# Patient Record
Sex: FEMALE | URBAN - METROPOLITAN AREA
[De-identification: names, ages, dates, MRNs, and addresses within clinical notes are randomized per-mention and may not be internally consistent; named-entity substitution may affect disease eponyms.]

---

## 2024-08-23 ENCOUNTER — LAB OUTSIDE AN ENCOUNTER (OUTPATIENT)
Dept: URBAN - METROPOLITAN AREA CLINIC 124 | Facility: CLINIC | Age: 24
End: 2024-08-23

## 2024-08-23 ENCOUNTER — OFFICE VISIT (OUTPATIENT)
Dept: URBAN - METROPOLITAN AREA CLINIC 124 | Facility: CLINIC | Age: 24
End: 2024-08-23
Payer: COMMERCIAL

## 2024-08-23 ENCOUNTER — DASHBOARD ENCOUNTERS (OUTPATIENT)
Age: 24
End: 2024-08-23

## 2024-08-23 VITALS
TEMPERATURE: 96.8 F | DIASTOLIC BLOOD PRESSURE: 76 MMHG | BODY MASS INDEX: 22.56 KG/M2 | HEIGHT: 65 IN | SYSTOLIC BLOOD PRESSURE: 120 MMHG | WEIGHT: 135.4 LBS | HEART RATE: 79 BPM

## 2024-08-23 DIAGNOSIS — R10.13 EPIGASTRIC DISCOMFORT: ICD-10-CM

## 2024-08-23 PROCEDURE — 99204 OFFICE O/P NEW MOD 45 MIN: CPT | Performed by: INTERNAL MEDICINE

## 2024-08-23 RX ORDER — OMEPRAZOLE 40 MG/1
1 CAPSULE 30 MINUTES BEFORE MORNING MEAL CAPSULE, DELAYED RELEASE ORAL ONCE A DAY
Status: ACTIVE | COMMUNITY

## 2024-08-23 RX ORDER — FAMOTIDINE 40 MG/1
1 TABLET TABLET, FILM COATED ORAL TWICE A DAY
Status: ACTIVE | COMMUNITY

## 2024-08-23 NOTE — HPI-TODAY'S VISIT:
August 24 visit: Patient was seen in August 2024 at [facility] for epigastric abdominal pain and recommended to have a abdominal ultrasound as well as EGD.  Labs from June 2024 showed normal LFTs, hemoglobin.  She was prescribed omeprazole and famotidine with no relief in symptoms.  Family history of peptic ulcer disease.  She was seen by [facility] via telemedicine visit.  PCP Pablo Abraham @ [place]  Epigastric burping sensation , X 4 months, better after drinking cold milk and PO intake, worse with emoty stomach. No nsaid use. She has been using Omeprazole intermittently for the past 3 months. she had 3 stool tests for h pylori, finally one resulted to be negative ( done off PPI). she is currently on famotdine X 4 weeks ago, GI symptoms are better with famotidine but relapsed. No melena / rectal bleeding.  she took 14 days of amoxicillin from [place] - symptoms got slightly better.

## 2024-08-28 ENCOUNTER — OFFICE VISIT (OUTPATIENT)
Dept: URBAN - METROPOLITAN AREA SURGERY CENTER 16 | Facility: SURGERY CENTER | Age: 24
End: 2024-08-28

## 2024-08-28 RX ORDER — ESOMEPRAZOLE MAGNESIUM 40 MG/1
1 CAPSULE CAPSULE, DELAYED RELEASE PELLETS ORAL ONCE A DAY
Qty: 90 CAPSULE | Refills: 0 | OUTPATIENT
Start: 2024-08-28

## 2024-08-28 RX ORDER — FAMOTIDINE 40 MG/1
1 TABLET TABLET, FILM COATED ORAL TWICE A DAY
Status: ACTIVE | COMMUNITY

## 2024-08-28 RX ORDER — OMEPRAZOLE 40 MG/1
1 CAPSULE 30 MINUTES BEFORE MORNING MEAL CAPSULE, DELAYED RELEASE ORAL ONCE A DAY
Status: ACTIVE | COMMUNITY

## 2024-09-05 ENCOUNTER — TELEPHONE ENCOUNTER (OUTPATIENT)
Dept: URBAN - METROPOLITAN AREA CLINIC 25 | Facility: CLINIC | Age: 24
End: 2024-09-05

## 2024-10-30 ENCOUNTER — OFFICE VISIT (OUTPATIENT)
Dept: URBAN - METROPOLITAN AREA CLINIC 25 | Facility: CLINIC | Age: 24
End: 2024-10-30

## 2024-10-30 RX ORDER — FAMOTIDINE 40 MG/1
1 TABLET TABLET, FILM COATED ORAL TWICE A DAY
Status: ACTIVE | COMMUNITY

## 2024-10-30 RX ORDER — OMEPRAZOLE 40 MG/1
1 CAPSULE 30 MINUTES BEFORE MORNING MEAL CAPSULE, DELAYED RELEASE ORAL ONCE A DAY
Status: ACTIVE | COMMUNITY

## 2024-10-30 RX ORDER — ESOMEPRAZOLE MAGNESIUM 40 MG/1
1 CAPSULE CAPSULE, DELAYED RELEASE PELLETS ORAL ONCE A DAY
Qty: 90 CAPSULE | Refills: 0 | Status: ACTIVE | COMMUNITY
Start: 2024-08-28

## 2024-11-04 ENCOUNTER — OFFICE VISIT (OUTPATIENT)
Dept: URBAN - METROPOLITAN AREA CLINIC 25 | Facility: CLINIC | Age: 24
End: 2024-11-04
Payer: COMMERCIAL

## 2024-11-04 VITALS
HEIGHT: 65 IN | HEART RATE: 80 BPM | BODY MASS INDEX: 23.53 KG/M2 | DIASTOLIC BLOOD PRESSURE: 76 MMHG | WEIGHT: 141.2 LBS | SYSTOLIC BLOOD PRESSURE: 115 MMHG | TEMPERATURE: 97.7 F

## 2024-11-04 DIAGNOSIS — K26.9 DUODENAL ULCER, UNSPECIFIED AS ACUTE OR CHRONIC, WITHOUT HEMORRHAGE OR PERFORATION: ICD-10-CM

## 2024-11-04 DIAGNOSIS — A04.8 HELICOBACTER PYLORI INFECTION: ICD-10-CM

## 2024-11-04 DIAGNOSIS — R10.13 EPIGASTRIC DISCOMFORT: ICD-10-CM

## 2024-11-04 PROCEDURE — 99213 OFFICE O/P EST LOW 20 MIN: CPT | Performed by: INTERNAL MEDICINE

## 2024-11-04 RX ORDER — FAMOTIDINE 40 MG/1
1 TABLET AT BEDTIME TABLET, FILM COATED ORAL ONCE A DAY
Qty: 90 TABLET | Refills: 3 | OUTPATIENT
Start: 2024-11-04

## 2024-11-04 RX ORDER — FAMOTIDINE 40 MG/1
1 TABLET TABLET, FILM COATED ORAL TWICE A DAY
Status: ON HOLD | COMMUNITY

## 2024-11-04 RX ORDER — OMEPRAZOLE 40 MG/1
1 CAPSULE 30 MINUTES BEFORE MORNING MEAL CAPSULE, DELAYED RELEASE ORAL ONCE A DAY
Status: ON HOLD | COMMUNITY

## 2024-11-04 RX ORDER — ESOMEPRAZOLE MAGNESIUM 40 MG/1
1 CAPSULE CAPSULE, DELAYED RELEASE PELLETS ORAL ONCE A DAY
Qty: 90 CAPSULE | Refills: 0 | Status: ACTIVE | COMMUNITY
Start: 2024-08-28

## 2024-11-04 NOTE — HPI-OTHER HISTORIES
August 24 visit: Patient was seen in August 2024 at [facility] for epigastric abdominal pain and recommended to have a abdominal ultrasound as well as EGD. Labs from June 2024 showed normal LFTs, hemoglobin. She was prescribed omeprazole and famotidine with no relief in symptoms. Family history of peptic ulcer disease. She was seen by [facility] via telemedicine visit.  PCP Pablo Abraham @ [place]  Epigastric burping sensation , X 4 months, better after drinking cold milk and PO intake, worse with emoty stomach. No nsaid use. She has been using Omeprazole intermittently for the past 3 months. she had 3 stool tests for h pylori, finally one resulted to be negative ( done off PPI). she is currently on famotdine X 4 weeks ago, GI symptoms are better with famotidine but relapsed. No melena / rectal bleeding.  she took 14 days of amoxicillin from [place] - symptoms got slightly better.

## 2024-11-04 NOTE — HPI-TODAY'S VISIT:
November 24 visit: EGD in August 2024 revealed no abnormal findings except duodenal erosions.  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  Esophagus biopsies were benign.  Gastric biopsies revealed histological changes suggestive of treated H. pylori gastritis.  She feels hungry even after eating food. she feels burning sensation especially worse with hunger. drinking milk helps. she has been using esomeprazole 40 mg qam. GI symptoms are better. she reports being positive for H Pylori 4 months ago dx in [place]  - she was treated with triple therapy ( she was tested in [place]).

## 2024-11-25 LAB — H PYLORI BREATH TEST: NOT DETECTED

## 2024-12-04 ENCOUNTER — TELEPHONE ENCOUNTER (OUTPATIENT)
Dept: URBAN - METROPOLITAN AREA CLINIC 25 | Facility: CLINIC | Age: 24
End: 2024-12-04

## 2024-12-04 RX ORDER — ESOMEPRAZOLE MAGNESIUM 20 MG/1
1 CAPSULE CAPSULE, DELAYED RELEASE PELLETS ORAL ONCE A DAY
Qty: 90 CAPSULE | Refills: 3 | OUTPATIENT
Start: 2024-12-04

## 2025-03-10 ENCOUNTER — OFFICE VISIT (OUTPATIENT)
Dept: URBAN - METROPOLITAN AREA CLINIC 25 | Facility: CLINIC | Age: 25
End: 2025-03-10